# Patient Record
Sex: FEMALE | Race: WHITE | NOT HISPANIC OR LATINO | URBAN - METROPOLITAN AREA
[De-identification: names, ages, dates, MRNs, and addresses within clinical notes are randomized per-mention and may not be internally consistent; named-entity substitution may affect disease eponyms.]

---

## 2024-04-23 NOTE — PROGRESS NOTES
Subjective   Patient ID: [id number]   Xena Garza is a 58 y.o. female Grave's disease, few bouts of pancreatitis, who presents for polyarthralgia   No referrals    Current rheum meds:  - None    Previous rheum meds:  - None    HPI  She reports being referred to us by a doctor is [facility]. She had blood tests done there (I cannot see them) and they told her she might have RA  Pain in her knees, knows they are bone on bone, gets injections, needs a replacement   Pain in the right wrist to the thumb, around 3 months ago, getting worse  Injury to her right hand  Reports swelling, redness and hotness in her right 1st CMC joint  Right ankle, shoulder and hip pains  Worse with activity   Evening is worse  MS of around 15-20 minutes  Tried Tylenol and Aleve and they help some  Had pancreatitis    + itchy eyes   + muscle pains  + severe stress at home    PSH: Back surgery, C section, hysterectomy, right shoulder surgery, cholecystectomy, right knee arthroscopy   Allergies: As per EHR  Habits: Smokes 1 pack a day since the age of 15 years, on and off, no alcohol for 19 years, smokes marijuana everyday   Social hx: , mother of 4 kids, 2 disabled sons, she is their primary caretaker, they are 33 years old, they all live with her, daughter is going through a divorce, her parents passed away and her brother doesn't talk to her since their dad passed away (will issues)    ROS:  Constitutional: Denies fever, chills, weight loss, night sweats or headaches  Eyes: Denies dry eyes, blurry vision, redness or pain  ENT: Denies dry mouth, dental loss, loss of taste, nasal or oral ulcers, jaw claudication, difficulty swallowing, nasal crusting or recurrent sinus infections   Cardiovascular: Denies chest pain, palpitations, orthopnea  Respiratory: Denies shortness of breath, cough, asthma, or recurrent respiratory infections  Gastrointestinal: Denies dysphagia, nausea, vomiting, heartburn, abdominal pain, constipation,  diarrhea, melena or hematochezia  Genitourinary: No recurrent urinary infections or STDs, no genital or anal ulcers  Integumentary: Denies photosensitivity, rash or lesions, Raynaud's phenomenon, skin tightening, digital ulcers, psoriatic lesions, or alopecia  Neurological: Denies any numbness or tingling, muscle weakness, or incontinence   Hematologic/Lymphatic: Denies bleeding, bruising, history of clots (arterial or venous), or abortions/miscarriages/pregnancy complications   MSK: No joint pains, redness, hotness or swelling. No inflammatory back pain, enthesitis, dactylitis. No morning stiffness   Muscular: Denies weakness, difficulty rising from chair or combing the hair, muscle aches, or problems with hand    FHx: No family history of autoimmune diseases     There is no problem list on file for this patient.     Past Medical History:   Diagnosis Date    COVID-19     COVID-19    Personal history of other diseases of the circulatory system 09/22/2020    History of hypertension    Personal history of other diseases of the musculoskeletal system and connective tissue 09/22/2020    History of muscle spasm    Personal history of other specified conditions     History of chest pain     Past Surgical History:   Procedure Laterality Date    OTHER SURGICAL HISTORY  09/22/2020    Appendectomy    OTHER SURGICAL HISTORY  09/22/2020    Shoulder surgery    OTHER SURGICAL HISTORY  09/22/2020    Hysterectomy     Social History     Socioeconomic History    Marital status:      Spouse name: Not on file    Number of children: Not on file    Years of education: Not on file    Highest education level: Not on file   Occupational History    Not on file   Tobacco Use    Smoking status: Not on file    Smokeless tobacco: Not on file   Substance and Sexual Activity    Alcohol use: Not on file    Drug use: Not on file    Sexual activity: Not on file   Other Topics Concern    Not on file   Social History Narrative    Not on file      Social Determinants of Health     Financial Resource Strain: Not on file   Food Insecurity: Not on file   Transportation Needs: Not on file   Physical Activity: Not on file   Stress: Not on file   Social Connections: Not on file   Intimate Partner Violence: Not on file   Housing Stability: Not on file     Allergies   Allergen Reactions    Cephalosporins Itching and Unknown     Ceclor Convulsions    Penicillins Hives, Swelling and Unknown     convulsions    Sulfamethoxazole-Trimethoprim Itching    Iodinated Contrast Media Hives, Itching and Rash     IODINE CONTRAST    Sulfa (Sulfonamide Antibiotics) Rash      Current Outpatient Medications:     amLODIPine (Norvasc) 10 mg tablet, 1 tablet (10 mg)., Disp: , Rfl:     methIMAzole (Tapazole) 5 mg tablet, TAKE 1 TABLET BY MOUTH EVERY 8 HOURS FOR 90 DAYS. NEXT REFILL CONTACT DR SAUCEDO, Disp: , Rfl:     predniSONE (Deltasone) 10 mg tablet, Take 1.5 tablets (15 mg) by mouth once daily for 5 days, THEN 1 tablet (10 mg) once daily for 5 days, THEN 0.5 tablets (5 mg) once daily for 5 days., Disp: 15 tablet, Rfl: 1     Objective   Visit Vitals  /87   Pulse 92   Temp 36.8 °C (98.2 °F)   Resp 20   Wt 68.9 kg (152 lb)   BMI 26.93 kg/m²   BSA 1.75 m²      Physical Exam:  General: AAOx3, Cooperative, tearful   Head: normocephalic, atraumatic, no hair loss   Eyes: EOMI, conjunctiva clear, sclera white, anicteric  Ears: hearing intact  Nose: no deformity, no crusting   Throat/Mouth: No oral deformities, no cheek swelling, mucosa appear moist, no oral ulcers noted or loss of dentition   Neck/Lymph: FROM, trachea midline  Lungs: chest expansion symmetric, clear to auscultation bilaterally. No wheezing, rhonchi, or stridor  Heart: S1, S2, RRR. No murmur or rub  Abdomen: Soft, non-tender without masses  Skin: No rashes, ulcers or photosensitive areas  MSK: Upper Extremities:  Hand/Fingers: TTP of the right 1st MCP and PIP. No erythema, edema or warmth at DIP, PIP, or MCP joints,  "FROM grossly. Good hand . No nodules. Positive DeQuervain's on the right   Wrists: No erythema, edema, warmth or tenderness at wrist, FROM grossly  Elbows: Right medical epicondyle TTP. No edema, erythema or warmth at elbows, FROM grossly. No nodules   Shoulders: Gap and TTP of the right AC joint. Right shoulder TTP. No edema, erythema, or warmth at shoulders. FROM  Lower Extremities:   Hips: No obvious deformities. No joint tenderness, normal ROM grossly. Log roll test negative bilaterally. Hu test is negative bilaterally. No trochanteric bursae TTP  Knees: No tenderness, deformities, edema, rashes, or warmth, normal ROM grossly. No crepitus, no pes anserine bursa TTP   Ankles, feet: No deformities, tenderness, edema, erythema, ulceration, or warmth at the ankle or MTP/IP joints, normal ROM grossly  Spine: No spinal tenderness to palpation. No SI joint tenderness    No results found for: \"WBC\", \"HGB\", \"HCT\", \"MCV\", \"PLT\"     Chemistry    No results found for: \"NA\", \"K\", \"CL\", \"CO2\", \"BUN\", \"CREATININE\", \"GLU\" No results found for: \"CALCIUM\", \"ALKPHOS\", \"AST\", \"ALT\", \"BILITOT\"     No results found for: \"CRP\"   No results found for: \"GISSEL\", \"RF\", \"SEDRATE\"   No results found for: \"CKTOTAL\"  No results found for: \"NEUTROABS\"   No results found for: \"FERRITIN\"   No results found for: \"HEPATOT\", \"HEPAIGM\", \"HEPBCIGM\", \"HEPBCAB\", \"HBEAG\", \"HEPCAB\"   No results found for: \"ALT\", \"AST\", \"GGT\", \"ALKPHOS\", \"BILITOT\"   No results found for: \"PPD\"   No results found for: \"URICACID\"   No results found for: \"PTH\", \"CALCIUM\", \"CAION\", \"PHOS\"   No results found for: \"SPEP\", \"UPEP\"   No results found for: \"ALBUR\", \"[id number]\"     NM PART 2 STRESS OR REST NO CHARGE  MRN: [id number]  Patient Name: NICKIE ACEVEDO     STUDY:  CARDIAC STRESS/REST INJECTION; PART 2 STRESS OR REST (NO CHARGE);  CARDIAC STRESS/REST (MYOCARDIAL PERFUSION/MIBI);  10/30/2020 2:27 pm     INDICATION:  chest tightness.     COMPARISON:  None.     ACCESSION " NUMBER(S):  [id number]; [id number]; [id number]     ORDERING CLINICIAN:  OWEN GARCIA     TECHNIQUE:  DIVISION OF NUCLEAR MEDICINE  STRESS MYOCARDIAL PERFUSION SCAN, ONE DAY PROTOCOL     The patient received an intravenous dose of  12.9 mCi of Tc-99m  Myoview and resting emission tomographic (SPECT) images of the  myocardium were acquired. The patient then underwent treadmill stress  exercising to  87 % of MPHR and achieving 6.8 METS.  At peak stress  an additional dose of   32.5 mCi of Tc-99m  Myoview was administered  and stress phase SPECT images of the myocardium were then acquired.  This acquisition included ECG-gated images to assess and quantify  ventricular function.     FINDINGS:  Stress and rest images both demonstrate a normal distribution of  perfusion throughout all LV segments with no sign of ischemia.     ECG-gated images demonstrate normal LV size and myocardial  contractility with an LV ejection fraction of  61 % (normal above 45  percent).     IMPRESSION:  1. Normal stress myocardial perfusion imaging.  2. Well-maintained left ventricular function.  61%     NM CARDIAC STRESS REST (MYOCARDIAL PERFUSION MIBI)  MRN: [id number]  Patient Name: NICKIE ACEVEDO     STUDY:  CARDIAC STRESS/REST INJECTION; PART 2 STRESS OR REST (NO CHARGE);  CARDIAC STRESS/REST (MYOCARDIAL PERFUSION/MIBI);  10/30/2020 2:27 pm     INDICATION:  chest tightness.     COMPARISON:  None.     ACCESSION NUMBER(S):  [id number]; [id number]; [id number]     ORDERING CLINICIAN:  OWEN GARCIA     TECHNIQUE:  DIVISION OF NUCLEAR MEDICINE  STRESS MYOCARDIAL PERFUSION SCAN, ONE DAY PROTOCOL     The patient received an intravenous dose of  12.9 mCi of Tc-99m  Myoview and resting emission tomographic (SPECT) images of the  myocardium were acquired. The patient then underwent treadmill stress  exercising to  87 % of MPHR and achieving 6.8 METS.  At peak stress  an additional dose of   32.5 mCi of Tc-99m  Myoview was administered  and stress phase SPECT  images of the myocardium were then acquired.  This acquisition included ECG-gated images to assess and quantify  ventricular function.     FINDINGS:  Stress and rest images both demonstrate a normal distribution of  perfusion throughout all LV segments with no sign of ischemia.     ECG-gated images demonstrate normal LV size and myocardial  contractility with an LV ejection fraction of  61 % (normal above 45  percent).     IMPRESSION:  1. Normal stress myocardial perfusion imaging.  2. Well-maintained left ventricular function.  61%     Nuclear Stress Test  MRN: [id number]  Patient Name: NICKIE ACEVEDO     STUDY:  CARDIAC STRESS/REST INJECTION; PART 2 STRESS OR REST (NO CHARGE);  CARDIAC STRESS/REST (MYOCARDIAL PERFUSION/MIBI);  10/30/2020 2:27 pm     INDICATION:  chest tightness.     COMPARISON:  None.     ACCESSION NUMBER(S):  [id number]; [id number]; [id number]     ORDERING CLINICIAN:  OWEN GARCIA     TECHNIQUE:  DIVISION OF NUCLEAR MEDICINE  STRESS MYOCARDIAL PERFUSION SCAN, ONE DAY PROTOCOL     The patient received an intravenous dose of  12.9 mCi of Tc-99m  Myoview and resting emission tomographic (SPECT) images of the  myocardium were acquired. The patient then underwent treadmill stress  exercising to  87 % of MPHR and achieving 6.8 METS.  At peak stress  an additional dose of   32.5 mCi of Tc-99m  Myoview was administered  and stress phase SPECT images of the myocardium were then acquired.  This acquisition included ECG-gated images to assess and quantify  ventricular function.     FINDINGS:  Stress and rest images both demonstrate a normal distribution of  perfusion throughout all LV segments with no sign of ischemia.     ECG-gated images demonstrate normal LV size and myocardial  contractility with an LV ejection fraction of  61 % (normal above 45  percent).     IMPRESSION:  1. Normal stress myocardial perfusion imaging.  2. Well-maintained left ventricular function.  61%        Assessment/Plan    This is a 58  yo female, with OA of the knees, Grave's disease, few bouts of pancreatitis, and a smoker, who presents for polyarthralgia   No referrals from within , referred by [facility] physician     Patient's picture is mainly of mechanical pains. 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 She has DeQuervain's tenosynovitis of the right wrist. Rest of the pains are likely OA in nature. Will do the work up since she reports having signs of RA on her last blood tests at [facility].     Labs:  None    Imaging:  Xray knees 12/23: Right knee degenerative joint disease. Mild increase in effusion.   Degenerative narrowing medial compartment LEFT knee.       # OA of the knees  # Polyarthralgia  # DeQuervain's tenosynovitis on the right  # Right medial epicondylitis     - Labs today  - Xrays today  - I will inform the patient of any urgent results, if any  - Prednisone taper for the tendinitis   - Refer to endocrinology for Grave's disease, she doesn't have any     RTC in 1 month for discussion of results     Plan, including risks and benefits, was discussed with the patient, informed on how to reach us.     To schedule an appointment, call [phone]  To reach the rheumatology office, call [phone]    Shruti Hodge MD      Division of Rheumatology  [facility]

## 2024-04-30 ENCOUNTER — LAB (OUTPATIENT)
Dept: LAB | Facility: LAB | Age: 59
End: 2024-04-30
Payer: COMMERCIAL

## 2024-04-30 ENCOUNTER — HOSPITAL ENCOUNTER (OUTPATIENT)
Dept: RADIOLOGY | Facility: CLINIC | Age: 59
Discharge: HOME | End: 2024-04-30
Payer: COMMERCIAL

## 2024-04-30 ENCOUNTER — OFFICE VISIT (OUTPATIENT)
Dept: RHEUMATOLOGY | Facility: CLINIC | Age: 59
End: 2024-04-30
Payer: COMMERCIAL

## 2024-04-30 VITALS
BODY MASS INDEX: 26.93 KG/M2 | RESPIRATION RATE: 20 BRPM | TEMPERATURE: 98.2 F | HEART RATE: 92 BPM | SYSTOLIC BLOOD PRESSURE: 145 MMHG | WEIGHT: 152 LBS | DIASTOLIC BLOOD PRESSURE: 87 MMHG

## 2024-04-30 DIAGNOSIS — M17.0 PRIMARY OSTEOARTHRITIS OF BOTH KNEES: ICD-10-CM

## 2024-04-30 DIAGNOSIS — E05.00 GRAVES DISEASE: ICD-10-CM

## 2024-04-30 DIAGNOSIS — M25.50 POLYARTHRALGIA: ICD-10-CM

## 2024-04-30 DIAGNOSIS — M17.11 PRIMARY OSTEOARTHRITIS OF RIGHT KNEE: ICD-10-CM

## 2024-04-30 DIAGNOSIS — M77.01 MEDIAL EPICONDYLITIS OF RIGHT ELBOW: ICD-10-CM

## 2024-04-30 DIAGNOSIS — M65.4 DE QUERVAIN'S TENOSYNOVITIS, RIGHT: ICD-10-CM

## 2024-04-30 DIAGNOSIS — M25.50 POLYARTHRALGIA: Primary | ICD-10-CM

## 2024-04-30 LAB
25(OH)D3 SERPL-MCNC: 21 NG/ML (ref 30–100)
ALBUMIN SERPL BCP-MCNC: 4.3 G/DL (ref 3.4–5)
ALP SERPL-CCNC: 97 U/L (ref 33–110)
ALT SERPL W P-5'-P-CCNC: 13 U/L (ref 7–45)
ANION GAP SERPL CALC-SCNC: 10 MMOL/L (ref 10–20)
AST SERPL W P-5'-P-CCNC: 11 U/L (ref 9–39)
BASOPHILS # BLD AUTO: 0.04 X10*3/UL (ref 0–0.1)
BASOPHILS NFR BLD AUTO: 0.5 %
BILIRUB SERPL-MCNC: 0.4 MG/DL (ref 0–1.2)
BUN SERPL-MCNC: 12 MG/DL (ref 6–23)
CALCIUM SERPL-MCNC: 9.4 MG/DL (ref 8.6–10.3)
CHLORIDE SERPL-SCNC: 104 MMOL/L (ref 98–107)
CK SERPL-CCNC: 55 U/L (ref 0–215)
CO2 SERPL-SCNC: 29 MMOL/L (ref 21–32)
CREAT SERPL-MCNC: 0.74 MG/DL (ref 0.5–1.05)
CREAT UR-MCNC: 103.2 MG/DL (ref 20–320)
CRP SERPL-MCNC: 0.29 MG/DL
EGFRCR SERPLBLD CKD-EPI 2021: >90 ML/MIN/1.73M*2
EOSINOPHIL # BLD AUTO: 0.11 X10*3/UL (ref 0–0.7)
EOSINOPHIL NFR BLD AUTO: 1.4 %
ERYTHROCYTE [DISTWIDTH] IN BLOOD BY AUTOMATED COUNT: 14.5 % (ref 11.5–14.5)
ERYTHROCYTE [SEDIMENTATION RATE] IN BLOOD BY WESTERGREN METHOD: 4 MM/H (ref 0–30)
GLUCOSE SERPL-MCNC: 92 MG/DL (ref 74–99)
HCT VFR BLD AUTO: 44.6 % (ref 36–46)
HGB BLD-MCNC: 14.5 G/DL (ref 12–16)
IMM GRANULOCYTES # BLD AUTO: 0.02 X10*3/UL (ref 0–0.7)
IMM GRANULOCYTES NFR BLD AUTO: 0.2 % (ref 0–0.9)
LYMPHOCYTES # BLD AUTO: 2.97 X10*3/UL (ref 1.2–4.8)
LYMPHOCYTES NFR BLD AUTO: 36.5 %
MCH RBC QN AUTO: 25.3 PG (ref 26–34)
MCHC RBC AUTO-ENTMCNC: 32.5 G/DL (ref 32–36)
MCV RBC AUTO: 78 FL (ref 80–100)
MONOCYTES # BLD AUTO: 0.35 X10*3/UL (ref 0.1–1)
MONOCYTES NFR BLD AUTO: 4.3 %
NEUTROPHILS # BLD AUTO: 4.65 X10*3/UL (ref 1.2–7.7)
NEUTROPHILS NFR BLD AUTO: 57.1 %
NRBC BLD-RTO: 0 /100 WBCS (ref 0–0)
PLATELET # BLD AUTO: 356 X10*3/UL (ref 150–450)
POTASSIUM SERPL-SCNC: 4.4 MMOL/L (ref 3.5–5.3)
PROT SERPL-MCNC: 6.8 G/DL (ref 6.4–8.2)
PROT UR-ACNC: 7 MG/DL (ref 5–24)
PROT/CREAT UR: 0.07 MG/MG CREAT (ref 0–0.17)
RBC # BLD AUTO: 5.74 X10*6/UL (ref 4–5.2)
RHEUMATOID FACT SER NEPH-ACNC: 13 IU/ML (ref 0–15)
SODIUM SERPL-SCNC: 139 MMOL/L (ref 136–145)
TSH SERPL-ACNC: 6.9 MIU/L (ref 0.44–3.98)
URATE SERPL-MCNC: 3.5 MG/DL (ref 2.3–6.7)
WBC # BLD AUTO: 8.1 X10*3/UL (ref 4.4–11.3)

## 2024-04-30 PROCEDURE — 82550 ASSAY OF CK (CPK): CPT

## 2024-04-30 PROCEDURE — 84156 ASSAY OF PROTEIN URINE: CPT

## 2024-04-30 PROCEDURE — 36415 COLL VENOUS BLD VENIPUNCTURE: CPT

## 2024-04-30 PROCEDURE — 99205 OFFICE O/P NEW HI 60 MIN: CPT | Performed by: STUDENT IN AN ORGANIZED HEALTH CARE EDUCATION/TRAINING PROGRAM

## 2024-04-30 PROCEDURE — 84443 ASSAY THYROID STIM HORMONE: CPT

## 2024-04-30 PROCEDURE — 73030 X-RAY EXAM OF SHOULDER: CPT | Mod: 50

## 2024-04-30 PROCEDURE — 72040 X-RAY EXAM NECK SPINE 2-3 VW: CPT

## 2024-04-30 PROCEDURE — 73130 X-RAY EXAM OF HAND: CPT | Mod: 50

## 2024-04-30 PROCEDURE — 85652 RBC SED RATE AUTOMATED: CPT

## 2024-04-30 PROCEDURE — 80053 COMPREHEN METABOLIC PANEL: CPT

## 2024-04-30 PROCEDURE — 73130 X-RAY EXAM OF HAND: CPT | Mod: BILATERAL PROCEDURE | Performed by: RADIOLOGY

## 2024-04-30 PROCEDURE — 82306 VITAMIN D 25 HYDROXY: CPT

## 2024-04-30 PROCEDURE — 86431 RHEUMATOID FACTOR QUANT: CPT

## 2024-04-30 PROCEDURE — 73030 X-RAY EXAM OF SHOULDER: CPT | Mod: BILATERAL PROCEDURE | Performed by: RADIOLOGY

## 2024-04-30 PROCEDURE — 86200 CCP ANTIBODY: CPT

## 2024-04-30 PROCEDURE — 72040 X-RAY EXAM NECK SPINE 2-3 VW: CPT | Performed by: RADIOLOGY

## 2024-04-30 PROCEDURE — 84550 ASSAY OF BLOOD/URIC ACID: CPT

## 2024-04-30 PROCEDURE — 85025 COMPLETE CBC W/AUTO DIFF WBC: CPT

## 2024-04-30 PROCEDURE — 86140 C-REACTIVE PROTEIN: CPT

## 2024-04-30 PROCEDURE — 82570 ASSAY OF URINE CREATININE: CPT

## 2024-04-30 RX ORDER — PREDNISONE 10 MG/1
TABLET ORAL DAILY
Qty: 15 TABLET | Refills: 1 | Status: SHIPPED | OUTPATIENT
Start: 2024-04-30 | End: 2024-05-15

## 2024-04-30 RX ORDER — METHIMAZOLE 5 MG/1
TABLET ORAL
COMMUNITY

## 2024-04-30 RX ORDER — CYCLOBENZAPRINE HCL 10 MG
10 TABLET ORAL 3 TIMES DAILY
COMMUNITY
Start: 2024-04-01 | End: 2024-04-11 | Stop reason: WASHOUT

## 2024-04-30 RX ORDER — AMLODIPINE BESYLATE 10 MG/1
10 TABLET ORAL
COMMUNITY
Start: 2024-02-27

## 2024-05-01 DIAGNOSIS — E55.9 VITAMIN D DEFICIENCY: Primary | ICD-10-CM

## 2024-05-01 LAB — CCP IGG SERPL-ACNC: <1 U/ML

## 2024-05-01 RX ORDER — CHOLECALCIFEROL (VITAMIN D3) 50 MCG
50 TABLET ORAL DAILY
Qty: 360 TABLET | Refills: 0 | Status: SHIPPED | OUTPATIENT
Start: 2024-05-01 | End: 2025-05-01

## 2024-05-21 NOTE — PROGRESS NOTES
Subjective   Patient ID: [id number]   Xena Garza is a 58 y.o. female with DeQuervain's tendinitis on the right, OA, Grave's disease, few bouts of pancreatitis, who presents for follow up    Current rheum meds:  - Vitamin D 200 international units daily, started in 5/24    Previous rheum meds:  - Prednisone     HPI   Trial of prednisone, took it twice, only helped some with her tendinitis   Got a wrist splint, that is helping her more  Still has pain  Can't stop working as she takes care of her children   She can't rest her hand long enough   No episodes of eye inflammation  No sicca sx  No chest pain, cough or dyspnea  No rashes  No infections    ROS:  As per HPI     Rheum hx (Recall from Dr. Walker's notes):  She reports being referred to us by a doctor is [facility]. She had blood tests done there (I cannot see them) and they told her she might have RA  Pain in her knees, knows they are bone on bone, gets injections, needs a replacement   Pain in the right wrist to the thumb, around 3 months ago, getting worse  Injury to her right hand  Reports swelling, redness and hotness in her right 1st CMC joint  Right ankle, shoulder and hip pains  Worse with activity   Evening is worse  MS of around 15-20 minutes  Tried Tylenol and Aleve and they help some  Had pancreatitis    + itchy eyes   + muscle pains  + severe stress at home    PSH: Back surgery, C section, hysterectomy, right shoulder surgery, cholecystectomy, right knee arthroscopy   Allergies: As per EHR  Habits: Smokes 1 pack a day since the age of 15 years, on and off, no alcohol for 19 years, smokes marijuana everyday   Social hx: , mother of 4 kids, 2 disabled sons, she is their primary caretaker, they are 33 years old, they all live with her, daughter is going through a divorce, her parents passed away and her brother doesn't talk to her since their dad passed away (will issues)  FHx: No family history of autoimmune diseases     There is no  problem list on file for this patient.     Past Medical History:   Diagnosis Date    COVID-19     COVID-19    Personal history of other diseases of the circulatory system 09/22/2020    History of hypertension    Personal history of other diseases of the musculoskeletal system and connective tissue 09/22/2020    History of muscle spasm    Personal history of other specified conditions     History of chest pain     Past Surgical History:   Procedure Laterality Date    OTHER SURGICAL HISTORY  09/22/2020    Appendectomy    OTHER SURGICAL HISTORY  09/22/2020    Shoulder surgery    OTHER SURGICAL HISTORY  09/22/2020    Hysterectomy     Social History     Socioeconomic History    Marital status:      Spouse name: Not on file    Number of children: Not on file    Years of education: Not on file    Highest education level: Not on file   Occupational History    Not on file   Tobacco Use    Smoking status: Not on file    Smokeless tobacco: Not on file   Substance and Sexual Activity    Alcohol use: Not on file    Drug use: Not on file    Sexual activity: Not on file   Other Topics Concern    Not on file   Social History Narrative    Not on file     Social Determinants of Health     Financial Resource Strain: Not on file   Food Insecurity: Not on file   Transportation Needs: Not on file   Physical Activity: Not on file   Stress: Not on file   Social Connections: Not on file   Intimate Partner Violence: Not on file   Housing Stability: Not on file     Allergies   Allergen Reactions    Cephalosporins Itching and Unknown     Ceclor Convulsions    Penicillins Hives, Swelling and Unknown     convulsions    Sulfamethoxazole-Trimethoprim Itching    Iodinated Contrast Media Hives, Itching and Rash     IODINE CONTRAST    Sulfa (Sulfonamide Antibiotics) Rash      Current Outpatient Medications:     amLODIPine (Norvasc) 10 mg tablet, 1 tablet (10 mg)., Disp: , Rfl:     cholecalciferol (Vitamin D-3) 50 MCG (2000 UT) tablet, Take 1  tablet (50 mcg) by mouth once daily., Disp: 360 tablet, Rfl: 0    cyclobenzaprine (Flexeril) 10 mg tablet, Take 0.5 tablets (5 mg) by mouth once daily at bedtime., Disp: 30 tablet, Rfl: 0    meloxicam (Mobic) 15 mg tablet, Take 1 tablet (15 mg) by mouth once daily., Disp: 20 tablet, Rfl: 1    methIMAzole (Tapazole) 5 mg tablet, TAKE 1 TABLET BY MOUTH EVERY 8 HOURS FOR 90 DAYS. NEXT REFILL CONTACT DR SAUCEDO, Disp: , Rfl:      Objective   Visit Vitals  /76   Temp 36.7 °C (98.1 °F)   Resp 20   Wt 69.9 kg (154 lb)   BMI 27.28 kg/m²   BSA 1.76 m²     Physical Exam:  General: AAOx3, Cooperative, tearful   Head: normocephalic, atraumatic, no hair loss   Eyes: EOMI, conjunctiva clear, sclera white, anicteric  Ears: hearing intact  Nose: no deformity, no crusting   Throat/Mouth: No oral deformities, no cheek swelling, mucosa appear moist, no oral ulcers noted or loss of dentition   Skin: No rashes, ulcers or photosensitive areas  MSK: Upper Extremities:  Hand/Fingers: No erythema, TTP, edema or warmth at DIP, PIP, or MCP joints, FROM grossly. Good hand . No nodules. Positive DeQuervain's on the right   Wrists: No erythema, edema, warmth or tenderness at wrist, FROM grossly  Elbows: No edema, erythema or warmth at elbows, FROM grossly. No nodules   Shoulders: Gap and TTP of the right AC joint. Right shoulder TTP. No edema, erythema, or warmth at shoulders. FROM  Lower Extremities:   Hips: No obvious deformities. No joint tenderness, normal ROM grossly. Log roll test negative bilaterally. Hu test is negative bilaterally. No trochanteric bursae TTP  Knees: No tenderness, deformities, edema, rashes, or warmth, normal ROM grossly. No crepitus, no pes anserine bursa TTP   Ankles, feet: No deformities, tenderness, edema, erythema, ulceration, or warmth at the ankle or MTP/IP joints, normal ROM grossly  Spine: No spinal tenderness to palpation. No SI joint tenderness    Lab Results   Component Value Date    WBC 8.1  "04/30/2024    HGB 14.5 04/30/2024    HCT 44.6 04/30/2024    MCV 78 (L) 04/30/2024     04/30/2024        Chemistry    Lab Results   Component Value Date/Time     04/30/2024 1557    K 4.4 04/30/2024 1557     04/30/2024 1557    CO2 29 04/30/2024 1557    BUN 12 04/30/2024 1557    CREATININE 0.74 04/30/2024 1557    Lab Results   Component Value Date/Time    CALCIUM 9.4 04/30/2024 1557    ALKPHOS 97 04/30/2024 1557    AST 11 04/30/2024 1557    ALT 13 04/30/2024 1557    BILITOT 0.4 04/30/2024 1557        Lab Results   Component Value Date    CRP 0.29 04/30/2024      Lab Results   Component Value Date    RF 13 04/30/2024    SEDRATE 4 04/30/2024      Lab Results   Component Value Date    CKTOTAL 55 04/30/2024     Lab Results   Component Value Date    NEUTROABS 4.65 04/30/2024      Lab Results   Component Value Date    ALT 13 04/30/2024    AST 11 04/30/2024    ALKPHOS 97 04/30/2024    BILITOT 0.4 04/30/2024      No results found for: \"PPD\"   Lab Results   Component Value Date    URICACID 3.5 04/30/2024      Lab Results   Component Value Date    CALCIUM 9.4 04/30/2024     XR shoulder 2+ views bilateral  Narrative: Interpreted By:  Frederick Hernandez,   STUDY:  XR SHOULDER 2+ VIEWS BILATERAL      INDICATION:  Signs/Symptoms:Pain in her shoulders, right shoulder injury w a gap  of the AC joint, feels displaced.      COMPARISON:  None      ACCESSION NUMBER(S):  [id number]      ORDERING CLINICIAN:  LISETTE PARKER      FINDINGS:  Truncated right distal clavicle with marked widening of the AC joint  and widened coracoclavicular distance. Findings suggest type 3  acromioclavicular joint injury of uncertain acuity. The clavicle may  have undergone prior osteolysis or resection.      The left shoulder demonstrates no osseous abnormality.      Impression: Truncated right distal clavicle with marked widening of the AC joint  and widened coracoclavicular distance. Findings suggest type 3  acromioclavicular joint injury of " uncertain acuity. The clavicle may  have undergone prior osteolysis or resection.      Signed by: Frederick Hernandez 5/3/2024 8:08 PM  Dictation workstation:   [id number]  XR cervical spine 2-3 views  Narrative: Interpreted By:  Frederick Hernandez,   STUDY:  XR CERVICAL SPINE 2-3 VIEWS      INDICATION:  Signs/Symptoms:Pain in the neck, mechanical.      COMPARISON:  None      ACCESSION NUMBER(S):  [id number]      ORDERING CLINICIAN:  LISETTE PARKER      FINDINGS:  Moderate cervical degenerative change with disc disease greatest C5  through C7 and facet arthrosis greatest C3 through C5. Alignment  normal. Prevertebral soft tissues normal. Impression      Impression: Moderate cervical degenerative changes      Signed by: Frederick Hernandez 5/3/2024 8:07 PM  Dictation workstation:   [id number]  XR hand 3+ views bilateral  Narrative: Interpreted By:  Frederick Hernandez,   STUDY:  XR HAND 3+ VIEWS BILATERAL      INDICATION:  Signs/Symptoms:Pain in both hands, evaluate for OA versus IA.      COMPARISON:  None      ACCESSION NUMBER(S):  [id number]      ORDERING CLINICIAN:  LISETTE PARKER      FINDINGS:  Mild osteoarthritis mostly 1st CMC.      No erosive changes or calcinosis.      Impression: Mild osteoarthritis mostly 1st CMC.      Signed by: Frederick Hernandez 5/3/2024 8:06 PM  Dictation workstation:   [id number]     Assessment/Plan    This is a 59 yo female, with OA of the knees, Grave's disease, few bouts of pancreatitis, and a smoker, who presents for follow up on DeQuervain's tenosynovitis on the right     Patient's picture is mainly of mechanical pains. 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 She has DeQuervain's tenosynovitis of the right wrist. Rest of the pains are likely OA in nature. Work up is pertinent for OA of the shoulder, neck and hands. Serology is negative. She took 2 prednisone courses, only helped some for her right wrist. Will try NSAIDs    Labs:  4/24:  CBC, CMP, ESR, CRP, CK, UA, Uprt wnl. TSH 6.9, vitamin D 21  RF, CCP negative     Imaging:  Xrays shoulders, hands and C spine: OA     Xray knees 12/23: Right knee degenerative joint disease. Mild increase in effusion.   Degenerative narrowing medial compartment LEFT knee.       # OA of the knees, hands, shoulders and C spine  # DeQuervain's tenosynovitis on the right, did not improve with steroids, will use NSAIDs  # Right medial epicondylitis, resolved  # Neck spasm on C spine xray  # Low vitamin D, started on replacement  # High TSH, endo zack is in 9/24, nothing earlier     - Meloxicam 15 mg daily for 2 weeks, if no improvement, will consider referral to PM&R or ortho  - Continue wearing the brace  - Stressed the importance of resting her right hand and wrist   - Ortho referral for the knee OA  - Ortho referral for the shoulder OA  - Follow up with PCP for Graves since endo zack is far   - Follow up with endocrinology for Grave's disease, has an pp with Dr. Nava in 9/24  - Pt will inform me how she does with the meloxicam   - Continue vitamin D     RTC as needed    Plan, including risks and benefits, was discussed with the patient, informed on how to reach us.     To schedule an appointment, call [phone]  To reach the rheumatology office, call [phone]    Shruti Hodge MD      Division of Rheumatology  [facility]

## 2024-06-03 ENCOUNTER — OFFICE VISIT (OUTPATIENT)
Dept: RHEUMATOLOGY | Facility: CLINIC | Age: 59
End: 2024-06-03
Payer: COMMERCIAL

## 2024-06-03 VITALS
DIASTOLIC BLOOD PRESSURE: 76 MMHG | WEIGHT: 154 LBS | SYSTOLIC BLOOD PRESSURE: 118 MMHG | RESPIRATION RATE: 20 BRPM | TEMPERATURE: 98.1 F | BODY MASS INDEX: 27.28 KG/M2

## 2024-06-03 DIAGNOSIS — M17.0 PRIMARY OSTEOARTHRITIS OF BOTH KNEES: ICD-10-CM

## 2024-06-03 DIAGNOSIS — M25.50 POLYARTHRALGIA: ICD-10-CM

## 2024-06-03 DIAGNOSIS — E55.9 VITAMIN D DEFICIENCY: ICD-10-CM

## 2024-06-03 DIAGNOSIS — R79.89 HIGH SERUM THYROID STIMULATING HORMONE (TSH): ICD-10-CM

## 2024-06-03 DIAGNOSIS — M65.4 DE QUERVAIN'S TENOSYNOVITIS, RIGHT: Primary | ICD-10-CM

## 2024-06-03 DIAGNOSIS — E05.00 GRAVES DISEASE: ICD-10-CM

## 2024-06-03 DIAGNOSIS — M19.012 PRIMARY OSTEOARTHRITIS OF BOTH SHOULDERS: ICD-10-CM

## 2024-06-03 DIAGNOSIS — M19.011 PRIMARY OSTEOARTHRITIS OF BOTH SHOULDERS: ICD-10-CM

## 2024-06-03 PROCEDURE — 99215 OFFICE O/P EST HI 40 MIN: CPT | Performed by: STUDENT IN AN ORGANIZED HEALTH CARE EDUCATION/TRAINING PROGRAM

## 2024-06-03 RX ORDER — MELOXICAM 15 MG/1
15 TABLET ORAL DAILY
Qty: 20 TABLET | Refills: 1 | Status: SHIPPED | OUTPATIENT
Start: 2024-06-03 | End: 2024-07-13

## 2024-06-03 RX ORDER — CYCLOBENZAPRINE HCL 10 MG
5 TABLET ORAL NIGHTLY
Qty: 30 TABLET | Refills: 0 | Status: SHIPPED | OUTPATIENT
Start: 2024-06-03 | End: 2024-08-02

## 2024-06-04 ENCOUNTER — OFFICE VISIT (OUTPATIENT)
Dept: ORTHOPEDIC SURGERY | Facility: CLINIC | Age: 59
End: 2024-06-04
Payer: COMMERCIAL

## 2024-06-04 DIAGNOSIS — M25.569 KNEE PAIN, UNSPECIFIED CHRONICITY, UNSPECIFIED LATERALITY: Primary | ICD-10-CM

## 2024-06-04 DIAGNOSIS — M25.511 RIGHT SHOULDER PAIN, UNSPECIFIED CHRONICITY: ICD-10-CM

## 2024-06-04 PROCEDURE — 20610 DRAIN/INJ JOINT/BURSA W/O US: CPT | Performed by: ORTHOPAEDIC SURGERY

## 2024-06-04 PROCEDURE — 2500000004 HC RX 250 GENERAL PHARMACY W/ HCPCS (ALT 636 FOR OP/ED): Performed by: ORTHOPAEDIC SURGERY

## 2024-06-04 PROCEDURE — 99214 OFFICE O/P EST MOD 30 MIN: CPT | Performed by: ORTHOPAEDIC SURGERY

## 2024-06-04 PROCEDURE — 2500000005 HC RX 250 GENERAL PHARMACY W/O HCPCS: Performed by: ORTHOPAEDIC SURGERY

## 2024-06-04 PROCEDURE — 99204 OFFICE O/P NEW MOD 45 MIN: CPT | Performed by: ORTHOPAEDIC SURGERY

## 2024-06-04 RX ORDER — TRIAMCINOLONE ACETONIDE 40 MG/ML
40 INJECTION, SUSPENSION INTRA-ARTICULAR; INTRAMUSCULAR
Status: COMPLETED | OUTPATIENT
Start: 2024-06-04 | End: 2024-06-04

## 2024-06-04 RX ORDER — LIDOCAINE HYDROCHLORIDE 10 MG/ML
2 INJECTION INFILTRATION; PERINEURAL
Status: COMPLETED | OUTPATIENT
Start: 2024-06-04 | End: 2024-06-04

## 2024-06-04 RX ADMIN — LIDOCAINE HYDROCHLORIDE 2 ML: 10 INJECTION, SOLUTION INFILTRATION; PERINEURAL at 09:08

## 2024-06-04 RX ADMIN — TRIAMCINOLONE ACETONIDE 40 MG: 40 INJECTION, SUSPENSION INTRA-ARTICULAR; INTRAMUSCULAR at 09:08

## 2024-06-04 NOTE — PROGRESS NOTES
History of Present Illness:   Patient presents today endorsing right shoulder pain.  The pain is worse with overhead activity and tends to wake the patient at night.  The patient denies a traumatic injury recently but did have excision of after several injuries including a fracture.  The pain is sharp in nature, localizes lateral and deep.  Better with rest.    She has known arthritis in the knees with x-rays at the outside hospital.  Corticosteroid injections requested today.  She cares for her two 33-year-old sons who are disabled.  She likes to hike.    Past Medical History:   Diagnosis Date    COVID-19     COVID-19    Personal history of other diseases of the circulatory system 09/22/2020    History of hypertension    Personal history of other diseases of the musculoskeletal system and connective tissue 09/22/2020    History of muscle spasm    Personal history of other specified conditions     History of chest pain     Past Surgical History:   Procedure Laterality Date    OTHER SURGICAL HISTORY  09/22/2020    Appendectomy    OTHER SURGICAL HISTORY  09/22/2020    Shoulder surgery    OTHER SURGICAL HISTORY  09/22/2020    Hysterectomy       Current Outpatient Medications:     amLODIPine (Norvasc) 10 mg tablet, 1 tablet (10 mg)., Disp: , Rfl:     cholecalciferol (Vitamin D-3) 50 MCG (2000 UT) tablet, Take 1 tablet (50 mcg) by mouth once daily., Disp: 360 tablet, Rfl: 0    cyclobenzaprine (Flexeril) 10 mg tablet, Take 0.5 tablets (5 mg) by mouth once daily at bedtime., Disp: 30 tablet, Rfl: 0    meloxicam (Mobic) 15 mg tablet, Take 1 tablet (15 mg) by mouth once daily., Disp: 20 tablet, Rfl: 1    methIMAzole (Tapazole) 5 mg tablet, TAKE 1 TABLET BY MOUTH EVERY 8 HOURS FOR 90 DAYS. NEXT REFILL CONTACT DR SAUCEDO, Disp: , Rfl:     Review of Systems   GENERAL: Negative for malaise, significant weight loss, fever  MUSCULOSKELETAL: see HPI  NEURO:  Negative    Physical Examination:  Right Shoulder:  Skin healthy to gross  inspection  No ecchymosis, no swelling, no gross atrophy  No tenderness to palpation over acromioclavicular joint  No tenderness to palpation over biceps tendon  No tenderness to palpation over the cervical spine   Palpable defect distal clavicle    ROM: normal  Strength:  Weakness in resisted external rotation      Pain with lift off and Speeds test   Negative Spurling´s test  Positive Neer and Hawkin´s test  Neurovascular exam normal distally    Bilateral knees tenderness over medial joint line, small effusion right greater than left    Imaging:  Radiographs demonstrate healthy joint spaces with no evidence of significant degenerative changes or fractures of the shoulder    Report outside hospital medial compartment narrowing of the knees    Assessment:  Patient with right shoulder pain concern for a full thickness rotator cuff tear based on weakness in external rotation, prior distal clavicle excision.  Bilateral medial compartment osteoarthritis, outside hospital films    Plan:  We discussed our presumptive diagnosis with the patient.  Based on the physical exam and symptoms we have a high clinical suspicion for a rotator cuff tear.  We reviewed the role of imaging, physical therapy, injections and the time frame to surgery and correlation with outcome.  The patient elected for: MRI for the shoulder      Regarding the knees based on caring for her children discussed simple injections today discussed she may be trending toward surgery.    L Inj/Asp: bilateral knee on 6/4/2024 9:08 AM  Indications: pain  Details: 22 G needle, anteromedial approach  Medications (Right): 2 mL lidocaine 10 mg/mL (1 %); 40 mg triamcinolone acetonide 40 mg/mL  Medications (Left): 2 mL lidocaine 10 mg/mL (1 %); 40 mg triamcinolone acetonide 40 mg/mL  Outcome: tolerated well, no immediate complications  Procedure, treatment alternatives, risks and benefits explained, specific risks discussed. Consent was given by the patient. Immediately  prior to procedure a time out was called to verify the correct patient, procedure, equipment, support staff and site/side marked as required. Patient was prepped and draped in the usual sterile fashion.

## 2024-06-18 ENCOUNTER — OFFICE VISIT (OUTPATIENT)
Dept: ORTHOPEDIC SURGERY | Facility: CLINIC | Age: 59
End: 2024-06-18
Payer: COMMERCIAL

## 2024-06-18 DIAGNOSIS — M25.531 RIGHT WRIST PAIN: ICD-10-CM

## 2024-06-18 PROCEDURE — 99213 OFFICE O/P EST LOW 20 MIN: CPT | Performed by: ORTHOPAEDIC SURGERY

## 2024-06-18 PROCEDURE — 2500000004 HC RX 250 GENERAL PHARMACY W/ HCPCS (ALT 636 FOR OP/ED): Performed by: ORTHOPAEDIC SURGERY

## 2024-06-18 PROCEDURE — 20550 NJX 1 TENDON SHEATH/LIGAMENT: CPT | Performed by: ORTHOPAEDIC SURGERY

## 2024-06-18 PROCEDURE — 2500000005 HC RX 250 GENERAL PHARMACY W/O HCPCS: Performed by: ORTHOPAEDIC SURGERY

## 2024-06-18 RX ORDER — LIDOCAINE HYDROCHLORIDE 10 MG/ML
0.5 INJECTION INFILTRATION; PERINEURAL
Status: COMPLETED | OUTPATIENT
Start: 2024-06-18 | End: 2024-06-18

## 2024-06-18 RX ORDER — TRIAMCINOLONE ACETONIDE 40 MG/ML
20 INJECTION, SUSPENSION INTRA-ARTICULAR; INTRAMUSCULAR
Status: COMPLETED | OUTPATIENT
Start: 2024-06-18 | End: 2024-06-18

## 2024-06-18 NOTE — PROGRESS NOTES
History of Present Illness:  The patient returns today complaining of right wrist pain. The pain is radial-sided.  The patient denies any recent or historical trauma.  The patient denies any numbness or tingling. The patients endorses the following treatments: Brace oral medications.  The pain is sharp, worse with lifting and motion and better with rest.    Doing well with some knee injections.    Review of Systems   GENERAL: Negative for malaise, significant weight loss, fever  MUSCULOSKELETAL: see HPI  NEURO:  Negative    Physical Examination:  Right Wrist:  Skin healthy and intact  No gross swelling or ecchymosis    Volar flexion, dorsiflexion, pronation/supination without limitation  No tenderness to palpation over distal radius  No tenderness to palpation over distal ulna or TFCC  No tenderness to palpation over the scaphoid  Negative piano key sign  Positive Finkelstein test  No pain with CMC grind  Negative Mahan's test  Neurovascular exam normal distally    Imaging:  No fractures or significant degenerative changes noted    Assessment:  Patient with right de Quervain's tenosynovitis also some symptoms consistent with mild carpal tunnel    Plan:  We reviewed the disease process with the patient.   We discussed the role for NSAID's, immobilization, and corticosteroid injection  The patient elected for: Corticosteroid injection.  She also has some minor symptoms consistent with carpal tunnel as well as mild carpal tunnel and possible early CMC arthritis discussed possible referral to hand specialist    Hand / UE Inj/Asp: R extensor compartment 1 for de Quervain's tenosynovitis on 6/18/2024 10:14 AM  Indications: pain  Details: 22 G needle, radial approach  Medications: 0.5 mL lidocaine 10 mg/mL (1 %); 20 mg triamcinolone acetonide 40 mg/mL  Outcome: tolerated well, no immediate complications  Procedure, treatment alternatives, risks and benefits explained, specific risks discussed. Consent was given by the  patient. Immediately prior to procedure a time out was called to verify the correct patient, procedure, equipment, support staff and site/side marked as required. Patient was prepped and draped in the usual sterile fashion.

## 2024-09-04 ENCOUNTER — APPOINTMENT (OUTPATIENT)
Dept: ENDOCRINOLOGY | Facility: CLINIC | Age: 59
End: 2024-09-04
Payer: COMMERCIAL

## 2024-10-02 ENCOUNTER — TELEPHONE (OUTPATIENT)
Dept: ORTHOPEDIC SURGERY | Facility: CLINIC | Age: 59
End: 2024-10-02
Payer: COMMERCIAL

## 2024-10-15 ENCOUNTER — OFFICE VISIT (OUTPATIENT)
Dept: ORTHOPEDIC SURGERY | Facility: CLINIC | Age: 59
End: 2024-10-15
Payer: COMMERCIAL

## 2024-10-15 DIAGNOSIS — M25.569 KNEE PAIN, UNSPECIFIED CHRONICITY, UNSPECIFIED LATERALITY: ICD-10-CM

## 2024-10-15 DIAGNOSIS — M25.531 RIGHT WRIST PAIN: ICD-10-CM

## 2024-10-15 PROCEDURE — 20610 DRAIN/INJ JOINT/BURSA W/O US: CPT | Mod: 50 | Performed by: ORTHOPAEDIC SURGERY

## 2024-10-15 PROCEDURE — 2500000004 HC RX 250 GENERAL PHARMACY W/ HCPCS (ALT 636 FOR OP/ED): Performed by: ORTHOPAEDIC SURGERY

## 2024-10-15 PROCEDURE — 99213 OFFICE O/P EST LOW 20 MIN: CPT | Performed by: ORTHOPAEDIC SURGERY

## 2024-10-15 RX ORDER — LIDOCAINE HYDROCHLORIDE 10 MG/ML
2 INJECTION, SOLUTION INFILTRATION; PERINEURAL
Status: COMPLETED | OUTPATIENT
Start: 2024-10-15 | End: 2024-10-15

## 2024-10-15 RX ORDER — TRIAMCINOLONE ACETONIDE 40 MG/ML
40 INJECTION, SUSPENSION INTRA-ARTICULAR; INTRAMUSCULAR
Status: COMPLETED | OUTPATIENT
Start: 2024-10-15 | End: 2024-10-15

## 2024-10-15 ASSESSMENT — PAIN SCALES - GENERAL: PAINLEVEL_OUTOF10: 8

## 2024-10-15 ASSESSMENT — PAIN - FUNCTIONAL ASSESSMENT: PAIN_FUNCTIONAL_ASSESSMENT: 0-10

## 2024-10-15 NOTE — PROGRESS NOTES
History of Present Illness:  Patient returns today for  injections with corticosteroid. The patient endorsing bilateral  knee pain refractory to activity modifications and oral medications.    She is still having some persistent swelling right greater than left knee.  She also has significant pain in her right wrist, had an injection for decor veins which provided only temporary relief.    She is very active as she cares for her tWo nonverbal adult children.    Review of Systems   GENERAL: Negative for malaise, significant weight loss, fever  MUSCULOSKELETAL: see HPI  NEURO:  Negative    Physical Examination:  Trace effusions  Tenderness over medial and lateral joint line  Effusion right greater than left knee, palpable Baker's cyst right side    Swelling along the first extensor compartment of the right wrist markedly positive Finkelstein's    Assessment:  Patient with known osteoarthritis of the knees, right knee Baker's cyst, right first extensor compartment tenosynovitis    Plan:  Corticosteroid injection provided, patient tolerated it well. See procedure note.  We discussed an approach to minimizing inflammation as she is getting recurrent swelling in both the knee as well as the wrist.  We discussed smoking cessation as well as some oral medications.    Aspiration provided for the Baker's cyst, injections for the knees.    Discussed the procedure briefly and referred to hand and wrist specialist.    L Inj/Asp: bilateral knee on 10/15/2024 9:02 AM  Indications: pain  Details: 22 G needle, anteromedial approach  Medications (Right): 2 mL lidocaine 10 mg/mL (1 %); 40 mg triamcinolone acetonide 40 mg/mL  Medications (Left): 2 mL lidocaine 10 mg/mL (1 %); 40 mg triamcinolone acetonide 40 mg/mL  Outcome: tolerated well, no immediate complications  Procedure, treatment alternatives, risks and benefits explained, specific risks discussed. Consent was given by the patient. Immediately prior to procedure a time out  was called to verify the correct patient, procedure, equipment, support staff and site/side marked as required. Patient was prepped and draped in the usual sterile fashion.

## 2024-11-08 ENCOUNTER — OFFICE VISIT (OUTPATIENT)
Dept: ORTHOPEDIC SURGERY | Facility: CLINIC | Age: 59
End: 2024-11-08
Payer: COMMERCIAL

## 2024-11-08 DIAGNOSIS — M18.11 ARTHRITIS OF CARPOMETACARPAL (CMC) JOINT OF RIGHT THUMB: Primary | ICD-10-CM

## 2024-11-08 PROCEDURE — 2500000004 HC RX 250 GENERAL PHARMACY W/ HCPCS (ALT 636 FOR OP/ED): Performed by: STUDENT IN AN ORGANIZED HEALTH CARE EDUCATION/TRAINING PROGRAM

## 2024-11-08 PROCEDURE — 99214 OFFICE O/P EST MOD 30 MIN: CPT | Mod: 25 | Performed by: STUDENT IN AN ORGANIZED HEALTH CARE EDUCATION/TRAINING PROGRAM

## 2024-11-08 PROCEDURE — 20600 DRAIN/INJ JOINT/BURSA W/O US: CPT | Mod: RT | Performed by: STUDENT IN AN ORGANIZED HEALTH CARE EDUCATION/TRAINING PROGRAM

## 2024-11-08 RX ORDER — DICLOFENAC SODIUM 10 MG/G
4 GEL TOPICAL 4 TIMES DAILY
Qty: 150 G | Refills: 0 | Status: SHIPPED | OUTPATIENT
Start: 2024-11-08

## 2024-11-08 RX ORDER — LIDOCAINE HYDROCHLORIDE 10 MG/ML
0.5 INJECTION, SOLUTION INFILTRATION; PERINEURAL
Status: COMPLETED | OUTPATIENT
Start: 2024-11-08 | End: 2024-11-08

## 2024-11-08 NOTE — PROGRESS NOTES
History of Present Illness:  Presents for evaluation of right wrist/thumb.  Patient denies inciting trauma.  The pain is localized about the base of the thumb. It is described as moderate. The pain occurs intermittantly and is worse with pinching and gripping. The patient presents due to persistent symptoms even with activity modification.    History of right de Quervain injection in June without improvement in pain    Two disabled adult children    Review of Systems   GENERAL: Negative for malaise, significant weight loss, fever  MUSCULOSKELETAL: see HPI  NEURO:  Negative    The patient's past medical history, family history, social history, and review of systems were reviewed. History is otherwise negative except as stated in the HPI.    Physical Examination:  General: Alert and oriented to person, place, and time.  No acute distress and breathing comfortably: Pleasant and cooperative with examination.  HEENT: Head is normocephalic and atraumatic.  Neck: Supple, no visible swelling.  Cardiovascular: No palpable tachycardia  Lungs: No audible wheezing or labored breathing  Abdomen: Nondistended.  On musculoskeletal examination, the patient has full elbow range of motion. In regards to the wrist, there is no obvious deformity. Range of motion is full in flexion, extension, pronation, and supination. Strength is 5/5 in flexion and extension. There is tenderness to palpation of the thumb CMC joint. Provocative testing with CMC grind is positive with crepitus. The thumb MP joint demonstrates no hyperextension instability. There is no tenderness to palpation about the 1st dorsal compartment, the SL interval, or the TFCC. Sensation and motor function are intact in the radial, ulnar, and median nerve distribution. There is no obvious thenar or intrinsic atrophy. All fingers are without triggering and are without pain over the A1 pulley. The patient can make a full composite fist. The hand itself is warm and well  perfused. The skin is intact throughout. The contralateral hand and wrist are normal to inspection, range of motion, stability, and strength.    Imaging:  AP, lateral, and oblique radiographs of the bilateral hands from April were reviewed. These reveal thumb right greater than left CMC arthritis.    S Inj/Asp: R thumb CMC on 11/8/2024 8:03 AM  Indications: pain  Details: 24 G needle, plantar approach  Medications: 5 mg triamcinolone acetonide 10 mg/mL; 0.5 mL lidocaine 10 mg/mL (1 %)  Outcome: tolerated well, no immediate complications  Procedure, treatment alternatives, risks and benefits explained, specific risks discussed. Consent was given by the patient. Immediately prior to procedure a time out was called to verify the correct patient, procedure, equipment, support staff and site/side marked as required. Patient was prepped and draped in the usual sterile fashion.             Assessment:  Patient with right cmc arthritis.  No improvement in past with de Quervain's injection.  Pain today appears to be more joint line based.  Recommend trial of Comfort Cool Voltaren gel and cortisone injection into the thumb CMC    Plan:    Right thumb CMC Injection. I had a long discussion with the patient regarding the diagnosis of thumb CMC arthritis and the risks/benefits/expected outcomes of various treatment options. At this point, the patient elected non-operative treatment consisting of activity modification, intermittant NSAIDs (if not medically contraindicated), and/or thumb spica splinting. I also discussed the option of a corticosteroid injection. Specifically, I reviewed the risks of injection which include, but are not limited to, infection, bleeding, pain, steroid flare, glycemic alteration, subcutaneous fat atrophy, skin hypopigmentation, soft tissue damage, and incomplete symptom relief. The patient consented to the injection, and then using sterile technique, I injected a 1mL of Kenalog 40 into the thumb CMC  joint. The injection site was dressed, and the patient tolerated the injection well. Finally, I have emphasized patience, as any benefit may take some time to manifest. Depending on the success of this non-operative course, I will see them back on an as needed basis.      Follow up:  3 months      Melania Rodriguez MD  Orthopaedic Surgeon

## 2025-04-02 ENCOUNTER — OFFICE VISIT (OUTPATIENT)
Dept: ORTHOPEDIC SURGERY | Facility: CLINIC | Age: 60
End: 2025-04-02
Payer: MEDICAID

## 2025-04-02 ENCOUNTER — HOSPITAL ENCOUNTER (OUTPATIENT)
Dept: RADIOLOGY | Facility: CLINIC | Age: 60
Discharge: HOME | End: 2025-04-02
Payer: MEDICAID

## 2025-04-02 DIAGNOSIS — M25.569 KNEE PAIN, UNSPECIFIED CHRONICITY, UNSPECIFIED LATERALITY: ICD-10-CM

## 2025-04-02 PROCEDURE — 99212 OFFICE O/P EST SF 10 MIN: CPT | Performed by: ORTHOPAEDIC SURGERY

## 2025-04-02 PROCEDURE — 20610 DRAIN/INJ JOINT/BURSA W/O US: CPT | Mod: 50 | Performed by: ORTHOPAEDIC SURGERY

## 2025-04-02 PROCEDURE — 2500000004 HC RX 250 GENERAL PHARMACY W/ HCPCS (ALT 636 FOR OP/ED): Performed by: ORTHOPAEDIC SURGERY

## 2025-04-02 PROCEDURE — 73564 X-RAY EXAM KNEE 4 OR MORE: CPT | Mod: 50

## 2025-04-02 PROCEDURE — 99212 OFFICE O/P EST SF 10 MIN: CPT | Mod: 25 | Performed by: ORTHOPAEDIC SURGERY

## 2025-04-02 RX ORDER — LIDOCAINE HYDROCHLORIDE 10 MG/ML
2 INJECTION, SOLUTION INFILTRATION; PERINEURAL
Status: COMPLETED | OUTPATIENT
Start: 2025-04-02 | End: 2025-04-02

## 2025-04-02 RX ORDER — TRIAMCINOLONE ACETONIDE 40 MG/ML
40 INJECTION, SUSPENSION INTRA-ARTICULAR; INTRAMUSCULAR
Status: COMPLETED | OUTPATIENT
Start: 2025-04-02 | End: 2025-04-02

## 2025-04-02 RX ADMIN — TRIAMCINOLONE ACETONIDE 40 MG: 40 INJECTION, SUSPENSION INTRA-ARTICULAR; INTRAMUSCULAR at 15:14

## 2025-04-02 RX ADMIN — LIDOCAINE HYDROCHLORIDE 2 ML: 10 INJECTION, SOLUTION INFILTRATION; PERINEURAL at 15:14

## 2025-04-02 NOTE — PROGRESS NOTES
History of Present Illness:  Patient returns today for  injections with corticosteroid. The patient endorsing bilateral  knee pain refractory to activity modifications and oral medications.    Review of Systems   GENERAL: Negative for malaise, significant weight loss, fever  MUSCULOSKELETAL: see HPI  NEURO:  Negative    Physical Examination:  Trace effusions  Tenderness over medial and lateral joint line    Radiographs demonstrate severe medial compartment moderate to severe patellofemoral arthritis    Assessment:  Patient with known osteoarthritis of the knees    Plan:  Corticosteroid injection provided, patient tolerated it well. See procedure note.  Discussed that based on the degree of arthrosis she is trending towards arthroplasty but has significant social constraints.    L Inj/Asp: bilateral knee on 4/2/2025 3:14 PM  Indications: pain  Details: 22 G needle, anteromedial approach  Medications (Right): 2 mL lidocaine 10 mg/mL (1 %); 40 mg triamcinolone acetonide 40 mg/mL  Medications (Left): 2 mL lidocaine 10 mg/mL (1 %); 40 mg triamcinolone acetonide 40 mg/mL  Outcome: tolerated well, no immediate complications  Procedure, treatment alternatives, risks and benefits explained, specific risks discussed. Consent was given by the patient. Immediately prior to procedure a time out was called to verify the correct patient, procedure, equipment, support staff and site/side marked as required. Patient was prepped and draped in the usual sterile fashion.

## 2025-07-02 ENCOUNTER — OFFICE VISIT (OUTPATIENT)
Dept: ORTHOPEDIC SURGERY | Facility: CLINIC | Age: 60
End: 2025-07-02
Payer: MEDICAID

## 2025-07-02 DIAGNOSIS — M25.569 KNEE PAIN, UNSPECIFIED CHRONICITY, UNSPECIFIED LATERALITY: Primary | ICD-10-CM

## 2025-07-02 PROCEDURE — 99212 OFFICE O/P EST SF 10 MIN: CPT | Mod: 25 | Performed by: ORTHOPAEDIC SURGERY

## 2025-07-02 PROCEDURE — 2500000004 HC RX 250 GENERAL PHARMACY W/ HCPCS (ALT 636 FOR OP/ED): Performed by: ORTHOPAEDIC SURGERY

## 2025-07-02 PROCEDURE — 20610 DRAIN/INJ JOINT/BURSA W/O US: CPT | Mod: 50 | Performed by: ORTHOPAEDIC SURGERY

## 2025-07-02 RX ORDER — LIDOCAINE HYDROCHLORIDE 10 MG/ML
2 INJECTION, SOLUTION INFILTRATION; PERINEURAL
Status: COMPLETED | OUTPATIENT
Start: 2025-07-02 | End: 2025-07-02

## 2025-07-02 RX ORDER — TRIAMCINOLONE ACETONIDE 40 MG/ML
40 INJECTION, SUSPENSION INTRA-ARTICULAR; INTRAMUSCULAR
Status: COMPLETED | OUTPATIENT
Start: 2025-07-02 | End: 2025-07-02

## 2025-07-02 RX ADMIN — TRIAMCINOLONE ACETONIDE 40 MG: 40 INJECTION, SUSPENSION INTRA-ARTICULAR; INTRAMUSCULAR at 11:15

## 2025-07-02 RX ADMIN — LIDOCAINE HYDROCHLORIDE 2 ML: 10 INJECTION, SOLUTION INFILTRATION; PERINEURAL at 11:15

## 2025-07-02 NOTE — PROGRESS NOTES
History of Present Illness:  Patient with known osteoarthritis of the knee who presents today for repeat evaluation.  The patient notes persistent pain as well as some occasional mechanical symptoms.  The patient has tried the following modalities: Rest ice anti-inflammatories, prior cortisone injection.    She states that based on the difficulty with her activity daily living and severe pain she like to discuss definitive intervention.    Review of Systems:   GENERAL: Negative for malaise, significant weight loss, fever  MUSCULOSKELETAL: see HPI  NEURO:  Negative    Physical Examination:  Bilateral Knee:  Skin healthy and intact  No gross swelling or ecchymosis  Effusion: Trace  ROM: Subtle decrease near full  Crepitance with range of motion  No pain with internal rotation of the hip  Tenderness to palpation: over medial and lateral joint line and with patellar compression     No laxity to valgus stress  No laxity to varus stress  Negative Lachman´s test  Negative posterior drawer test  Mild pain with Shivani´s test    Neurovascular exam normal distally  2+ DP pulse and good cap refill    Imaging:  Reviewed, degenerative joint disease noted severe bilateral    Assessment:  Patient with known osteoarthritis of the bilateral knee    Plan:  We reviewed an evidence-based approach to OA of the knee.  We discussed past treatments as well options for future treatment.  We discussed NSAIDS (risks and benefits), low impact activities.    The patient has failed to improve with multiple non-operative modalities.  There is increasing difficulty with activities of daily living and concern for falls.  The patient is endorsing severe pain and disability.      We had a lengthy discussion regarding total knee arthroplasty including the orthopaedic risks, including but not limited to: stiffness, infection, hematoma, early aseptic loosening, neurologic or vascular injury, clicking, difficulty kneeling and incomplete relief of  pain.  We reviewed the medical risks, including but not limited to: deep venous thrombosis, pulmonary embolism, and cardiovascular/pulmonary issues.    We discussed the anticipated longevity of the implants and potential for revision surgery.  We also discussed anticoagulation, rehabilitation goals, and the hospital course.  We discussed the likelihood of opioid analgesics and risks associated with them.    The next step is to obtain medical risk stratification and encouraged the pre-operative information seminar at the hospital.  We are happy to provide assistance and counseling if the patient has any additional concerns.  She would like to pursue scheduling for the right knee this fall.  Discussed 3 months of injections or surgery      L Inj/Asp: bilateral knee on 7/2/2025 11:15 AM  Indications: pain  Details: 22 G needle, anteromedial approach  Medications (Right): 2 mL lidocaine 10 mg/mL (1 %); 40 mg triamcinolone acetonide 40 mg/mL  Medications (Left): 2 mL lidocaine 10 mg/mL (1 %); 40 mg triamcinolone acetonide 40 mg/mL  Outcome: tolerated well, no immediate complications  Procedure, treatment alternatives, risks and benefits explained, specific risks discussed. Consent was given by the patient. Immediately prior to procedure a time out was called to verify the correct patient, procedure, equipment, support staff and site/side marked as required. Patient was prepped and draped in the usual sterile fashion.

## 2025-08-25 ENCOUNTER — PREP FOR PROCEDURE (OUTPATIENT)
Dept: ORTHOPEDIC SURGERY | Facility: CLINIC | Age: 60
End: 2025-08-25

## 2025-08-25 ENCOUNTER — OFFICE VISIT (OUTPATIENT)
Dept: ORTHOPEDIC SURGERY | Facility: CLINIC | Age: 60
End: 2025-08-25
Payer: MEDICAID

## 2025-08-25 DIAGNOSIS — M25.569 KNEE PAIN, UNSPECIFIED CHRONICITY, UNSPECIFIED LATERALITY: Primary | ICD-10-CM

## 2025-08-25 PROBLEM — M17.11 UNILATERAL PRIMARY OSTEOARTHRITIS, RIGHT KNEE: Status: ACTIVE | Noted: 2025-08-25

## 2025-08-25 PROCEDURE — 99212 OFFICE O/P EST SF 10 MIN: CPT | Performed by: ORTHOPAEDIC SURGERY
